# Patient Record
Sex: MALE | Race: WHITE | Employment: OTHER | URBAN - METROPOLITAN AREA
[De-identification: names, ages, dates, MRNs, and addresses within clinical notes are randomized per-mention and may not be internally consistent; named-entity substitution may affect disease eponyms.]

---

## 2019-06-17 ENCOUNTER — HOSPITAL ENCOUNTER (EMERGENCY)
Age: 69
Discharge: HOME | End: 2019-06-17
Attending: EMERGENCY MEDICINE | Admitting: EMERGENCY MEDICINE
Payer: MEDICARE

## 2019-06-17 ENCOUNTER — APPOINTMENT (OUTPATIENT)
Dept: CT IMAGING | Age: 69
End: 2019-06-17
Attending: EMERGENCY MEDICINE
Payer: MEDICARE

## 2019-06-17 ENCOUNTER — APPOINTMENT (OUTPATIENT)
Dept: GENERAL RADIOLOGY | Age: 69
End: 2019-06-17
Attending: EMERGENCY MEDICINE
Payer: MEDICARE

## 2019-06-17 VITALS
RESPIRATION RATE: 16 BRPM | HEART RATE: 82 BPM | OXYGEN SATURATION: 96 % | BODY MASS INDEX: 27.92 KG/M2 | TEMPERATURE: 97.6 F | SYSTOLIC BLOOD PRESSURE: 134 MMHG | WEIGHT: 195 LBS | DIASTOLIC BLOOD PRESSURE: 72 MMHG | HEIGHT: 70 IN

## 2019-06-17 DIAGNOSIS — M25.511 ACUTE PAIN OF RIGHT SHOULDER: Primary | ICD-10-CM
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
## 2019-06-17 DIAGNOSIS — M54.2 NECK PAIN: ICD-10-CM

## 2019-06-17 LAB
ATRIAL RATE: 94 BPM
CALCULATED P AXIS, ECG09: 80 DEGREES
CALCULATED R AXIS, ECG10: 86 DEGREES
CALCULATED T AXIS, ECG11: 79 DEGREES
DIAGNOSIS, 93000: NORMAL
P-R INTERVAL, ECG05: 136 MS
Q-T INTERVAL, ECG07: 336 MS
QRS DURATION, ECG06: 76 MS
QTC CALCULATION (BEZET), ECG08: 420 MS
VENTRICULAR RATE, ECG03: 94 BPM

## 2019-06-17 PROCEDURE — 90715 TDAP VACCINE 7 YRS/> IM: CPT | Performed by: EMERGENCY MEDICINE

## 2019-06-17 PROCEDURE — 74011250637 HC RX REV CODE- 250/637: Performed by: EMERGENCY MEDICINE

## 2019-06-17 PROCEDURE — 90471 IMMUNIZATION ADMIN: CPT | Performed by: EMERGENCY MEDICINE

## 2019-06-17 PROCEDURE — 70450 CT HEAD/BRAIN W/O DYE: CPT

## 2019-06-17 PROCEDURE — 96374 THER/PROPH/DIAG INJ IV PUSH: CPT | Performed by: EMERGENCY MEDICINE

## 2019-06-17 PROCEDURE — 72125 CT NECK SPINE W/O DYE: CPT

## 2019-06-17 PROCEDURE — 71045 X-RAY EXAM CHEST 1 VIEW: CPT

## 2019-06-17 PROCEDURE — 93005 ELECTROCARDIOGRAM TRACING: CPT | Performed by: EMERGENCY MEDICINE

## 2019-06-17 PROCEDURE — 96375 TX/PRO/DX INJ NEW DRUG ADDON: CPT | Performed by: EMERGENCY MEDICINE

## 2019-06-17 PROCEDURE — 74011250636 HC RX REV CODE- 250/636: Performed by: EMERGENCY MEDICINE

## 2019-06-17 PROCEDURE — 99285 EMERGENCY DEPT VISIT HI MDM: CPT | Performed by: EMERGENCY MEDICINE

## 2019-06-17 PROCEDURE — 73030 X-RAY EXAM OF SHOULDER: CPT

## 2019-06-17 RX ORDER — HYDROCODONE BITARTRATE AND ACETAMINOPHEN 5; 325 MG/1; MG/1
1 TABLET ORAL
Qty: 7 TAB | Refills: 0 | Status: SHIPPED | OUTPATIENT
Start: 2019-06-17 | End: 2019-06-20

## 2019-06-17 RX ORDER — HYDROMORPHONE HYDROCHLORIDE 1 MG/ML
0.5 INJECTION, SOLUTION INTRAMUSCULAR; INTRAVENOUS; SUBCUTANEOUS ONCE
Status: COMPLETED | OUTPATIENT
Start: 2019-06-17 | End: 2019-06-17

## 2019-06-17 RX ORDER — ONDANSETRON 2 MG/ML
4 INJECTION INTRAMUSCULAR; INTRAVENOUS
Status: COMPLETED | OUTPATIENT
Start: 2019-06-17 | End: 2019-06-17

## 2019-06-17 RX ORDER — OXYCODONE HYDROCHLORIDE 5 MG/1
5 TABLET ORAL
Status: COMPLETED | OUTPATIENT
Start: 2019-06-17 | End: 2019-06-17

## 2019-06-17 RX ORDER — HYDROMORPHONE HYDROCHLORIDE 1 MG/ML
0.5 INJECTION, SOLUTION INTRAMUSCULAR; INTRAVENOUS; SUBCUTANEOUS ONCE
Status: DISCONTINUED | OUTPATIENT
Start: 2019-06-17 | End: 2019-06-17

## 2019-06-17 RX ORDER — MORPHINE SULFATE 4 MG/ML
4 INJECTION INTRAVENOUS
Status: COMPLETED | OUTPATIENT
Start: 2019-06-17 | End: 2019-06-17

## 2019-06-17 RX ADMIN — MORPHINE SULFATE 4 MG: 4 INJECTION INTRAVENOUS at 16:16

## 2019-06-17 RX ADMIN — ONDANSETRON 4 MG: 2 INJECTION INTRAMUSCULAR; INTRAVENOUS at 16:16

## 2019-06-17 RX ADMIN — HYDROMORPHONE HYDROCHLORIDE 0.5 MG: 1 INJECTION, SOLUTION INTRAMUSCULAR; INTRAVENOUS; SUBCUTANEOUS at 17:29

## 2019-06-17 RX ADMIN — TETANUS TOXOID, REDUCED DIPHTHERIA TOXOID AND ACELLULAR PERTUSSIS VACCINE, ADSORBED 0.5 ML: 5; 2.5; 8; 8; 2.5 SUSPENSION INTRAMUSCULAR at 18:55

## 2019-06-17 RX ADMIN — OXYCODONE HYDROCHLORIDE 5 MG: 5 TABLET ORAL at 18:55

## 2019-06-17 NOTE — ED TRIAGE NOTES
Pt arrives via [facility], pt fell down hill, R shoulder and neck pain, not midline, obvious deformity. Pt A&O x4. Pt not on blood thinners. VSS.

## 2019-06-17 NOTE — ED NOTES
I have reviewed discharge instructions with the patient and family. The patient and family verbalized understanding. Patient left ED via Discharge Method: ambulatory to Home with family. Opportunity for questions and clarification provided. Patient given 1 scripts. No e-sign. To continue your aftercare when you leave the hospital, you may receive an automated call from our care team to check in on how you are doing. This is a free service and part of our promise to provide the best care and service to meet your aftercare needs.  If you have questions, or wish to unsubscribe from this service please call [phone]. Thank you for Choosing our [address] [place] [address] Emergency Department.

## 2019-06-17 NOTE — ED PROVIDER NOTES
78 Yo male with fall. States he was walking up a hill and slipped and tried to turn around and run down the hill to catch himself and states \"my body go ahead of my feet\" and states he couldn't catch up and when he got to the bottom of the hill states he went forward hitting his head and right shoulder. States pain to right shoulder, neck and head. No back pain, no chest pain or abdominal pain, no further injuries or complaints. Denies being on any blood thinners, denies LOC and recalls entire event in detail. Patients son actually has video of this occurring which he showed me and purely mechanical in nature.            Past Medical History:   Diagnosis Date    Arthritis     osteoarthritis spine     Backache, unspecified 12/12/2012    Chronic airway obstruction, not elsewhere classified 12/12/2012    Chronic pain     lower back - off/on 40 years     COPD     mild emphysema     Lumbago 12/12/2012    Other and unspecified hyperlipidemia 12/12/2012    Peyronie's disease 12/12/2012       Past Surgical History:   Procedure Laterality Date    HX HEENT      tonsilectomy     HX HEENT      R cataract removal with lens implant     HX ORTHOPAEDIC      R wrist fx repair          Family History:   Problem Relation Age of Onset    Diabetes Mother     Asthma Mother     Arthritis-osteo Mother     Allergic Rhinitis Mother     Cancer Father         bladder tumors    Heart Disease Brother     Lung Disease Brother     Heart Disease Paternal Uncle     High Cholesterol Other        Social History     Socioeconomic History    Marital status: SINGLE     Spouse name: Not on file    Number of children: Not on file    Years of education: Not on file    Highest education level: Not on file   Occupational History    Not on file   Social Needs    Financial resource strain: Not on file    Food insecurity:     Worry: Not on file     Inability: Not on file    Transportation needs:     Medical: Not on file Non-medical: Not on file   Tobacco Use    Smoking status: Smoker, Current Status Unknown     Packs/day: 1.00    Smokeless tobacco: Never Used   Substance and Sexual Activity    Alcohol use: Yes     Alcohol/week: 1.0 oz     Types: 2 Cans of beer per week     Comment: daily     Drug use: No    Sexual activity: Not on file   Lifestyle    Physical activity:     Days per week: Not on file     Minutes per session: Not on file    Stress: Not on file   Relationships    Social connections:     Talks on phone: Not on file     Gets together: Not on file     Attends [employer] service: Not on file     Active member of club or organization: Not on file     Attends meetings of clubs or organizations: Not on file     Relationship status: Not on file    Intimate partner violence:     Fear of current or ex partner: Not on file     Emotionally abused: Not on file     Physically abused: Not on file     Forced sexual activity: Not on file   Other Topics Concern    Not on file   Social History Narrative    Not on file         ALLERGIES: Patient has no known allergies. Review of Systems   Constitutional: Negative for chills and fever. HENT: Negative for rhinorrhea and sore throat. Eyes: Negative for visual disturbance. Respiratory: Negative for cough and shortness of breath. Cardiovascular: Negative for chest pain and leg swelling. Gastrointestinal: Negative for abdominal pain, diarrhea, nausea and vomiting. Genitourinary: Negative for dysuria. Musculoskeletal: Positive for arthralgias and neck pain. Negative for back pain. Skin: Negative for rash. Neurological: Negative for weakness and headaches. Psychiatric/Behavioral: The patient is not nervous/anxious. Vitals:    06/17/19 1544   BP: 161/79   Pulse: 81   Resp: 16   Temp: 97.7 °F (36.5 °C)   SpO2: 98%   Weight: 88.5 kg (195 lb)   Height: 5' 10\" (1.778 m)            Physical Exam   Constitutional: He is oriented to person, place, and time.  He appears well-developed and well-nourished. HENT:   Head: Normocephalic. Right Ear: External ear normal.   Left Ear: External ear normal.   Eyes: Pupils are equal, round, and reactive to light. Conjunctivae and EOM are normal.   Neck: Normal range of motion. Neck supple. No tracheal deviation present. Cardiovascular: Normal rate, regular rhythm, normal heart sounds and intact distal pulses. No murmur heard. Pulmonary/Chest: Effort normal and breath sounds normal. No respiratory distress. Abdominal: Soft. He exhibits no distension. There is no tenderness. There is no guarding. Non-tender to palpation of entire abdomen. Very benign abdominal exam.   Musculoskeletal: He exhibits tenderness. Pain to palpation of left shoulder with ROM limited slightly due to pain. Radial pulses 2+ bilaterally. Pain to palpation of right paraspinal muscles of C-spine without significant midline tenderness. Non-tender to palpation of T and L spine. No stepoffs or deformities noted. Strength 5/5 in all extremities, pulses intact in all extremities distally     Neurological: He is alert and oriented to person, place, and time. No cranial nerve deficit. Skin: No rash noted. Nursing note and vitals reviewed.        MDM  Number of Diagnoses or Management Options     Amount and/or Complexity of Data Reviewed  Tests in the radiology section of CPT®: ordered and reviewed  Review and summarize past medical records: yes    Risk of Complications, Morbidity, and/or Mortality  Presenting problems: high  Diagnostic procedures: high  Management options: high    Patient Progress  Patient progress: stable         Procedures  Recent Results (from the past 12 hour(s))   EKG, 12 LEAD, INITIAL    Collection Time: 06/17/19  4:41 PM   Result Value Ref Range    Ventricular Rate 94 BPM    Atrial Rate 94 BPM    P-R Interval 136 ms    QRS Duration 76 ms    Q-T Interval 336 ms    QTC Calculation (Bezet) 420 ms    Calculated P Axis 80 degrees Calculated R Axis 86 degrees    Calculated T Axis 79 degrees    Diagnosis       !! AGE AND GENDER SPECIFIC ECG ANALYSIS !! Normal sinus rhythm  ST abnormality, possible digitalis effect  Abnormal ECG  No previous ECGs available       Xr Chest Sngl V    Result Date: 6/17/2019  Portable chest x-ray CLINICAL INDICATION: Shoulder pain after a fall FINDINGS: Single AP lordotic view of the chest submitted show the lungs to be expanded and clear. No pleural effusion or pneumothorax noted. The cardiac silhouette and mediastinum are unremarkable. No fractures noted. IMPRESSION: Unremarkable, limited portable chest x-ray. Xr Shoulder Rt Ap/lat Min 2 V    Result Date: 6/17/2019  Right shoulder CLINICAL INDICATION: Right shoulder pain after a fall FINDINGS: Four views of the right shoulder show an oblique fracture through the distal clavicle. There is no dislocation or [facility] of the [facility] joint. IMPRESSION: Oblique fracture through the distal clavicle. Ct Head Wo Cont    Result Date: 6/17/2019  CT of the head without contrast. CLINICAL INDICATION: Head trauma after a fall PROCEDURE: Serial thin section axial images are obtained from the cranial vertex through the skull base without the administration of intravenous contrast. Radiation dose reduction techniques were used for this study. Our CT scanners use one or all of the following: Automated exposure control, adjusted of the mA and/or kV according to patient size, iterative reconstruction COMPARISON: No prior. FINDINGS: There is no acute intracranial hemorrhage, mass, or mass effect. No abnormal extra-axial fluid collections identified. There is no hydrocephalus. The basilar cisterns are widely patent. The gray-white matter brain parenchymal interface is well-maintained. No skull fracture or aggressive osseous lesion noted. The mastoid air cells and included paranasal sinuses are clear. IMPRESSION: No acute intracranial abnormality.      Ct Spine Cerv Wo Cont    Result Date: 6/17/2019  EXAM: CT SPINE CERV WO CONT INDICATION: fall, neck pain COMPARISON: None. TECHNIQUE:  Unenhanced multislice helical CT of the cervical spine was performed in the axial plane. Sagittal and coronal reconstructions were obtained. CT dose reduction was achieved through use of a standardized protocol tailored for this examination and automatic exposure control for dose modulation. FINDINGS: The cervical vertebral bodies are normal in height and alignment. Degenerative disc changes are noted at C5-C6 and C6-C7. Degenerative facet arthropathy is present on the left at C2-C3 and C3-C4 and on the right at C7-T1. No aggressive or destructive bone lesions appreciated. The C1-C2 articulation is intact. There is degenerative arthrosis at this level. The craniocervical junction is unremarkable. No prevertebral or posterior soft tissue edema. Axial images: The anterior and posterior arches of C1 are intact. The foramina transversarium are patent throughout. No fractures identified on the axial imaging. Emphysematous changes noted in the lung apices. IMPRESSION: 1. No acute osseous abnormality or malalignment. 2. Extensive multilevel degenerative spondylosis. 3. Emphysema    76 yo male with shoulder injury:    Ambulates in ED without difficulty, Tdap given, will  Place in sling and to follow up with orthopedic surgery for clavicle fracture. Will return with any worsening pain or any further concerns.